# Patient Record
Sex: MALE | Race: ASIAN | NOT HISPANIC OR LATINO
[De-identification: names, ages, dates, MRNs, and addresses within clinical notes are randomized per-mention and may not be internally consistent; named-entity substitution may affect disease eponyms.]

---

## 2019-11-21 ENCOUNTER — TRANSCRIPTION ENCOUNTER (OUTPATIENT)
Age: 24
End: 2019-11-21

## 2019-11-22 ENCOUNTER — EMERGENCY (EMERGENCY)
Facility: HOSPITAL | Age: 24
LOS: 1 days | Discharge: HOME | End: 2019-11-22
Attending: EMERGENCY MEDICINE
Payer: COMMERCIAL

## 2019-11-22 VITALS
DIASTOLIC BLOOD PRESSURE: 86 MMHG | HEART RATE: 66 BPM | WEIGHT: 186.95 LBS | TEMPERATURE: 98 F | HEIGHT: 74 IN | RESPIRATION RATE: 16 BRPM | OXYGEN SATURATION: 100 % | SYSTOLIC BLOOD PRESSURE: 135 MMHG

## 2019-11-22 PROCEDURE — 99284 EMERGENCY DEPT VISIT MOD MDM: CPT

## 2019-11-22 RX ORDER — TETANUS TOXOID, REDUCED DIPHTHERIA TOXOID AND ACELLULAR PERTUSSIS VACCINE, ADSORBED 5; 2.5; 8; 8; 2.5 [IU]/.5ML; [IU]/.5ML; UG/.5ML; UG/.5ML; UG/.5ML
0.5 SUSPENSION INTRAMUSCULAR ONCE
Refills: 0 | Status: COMPLETED | OUTPATIENT
Start: 2019-11-22 | End: 2019-11-22

## 2019-11-22 RX ADMIN — TETANUS TOXOID, REDUCED DIPHTHERIA TOXOID AND ACELLULAR PERTUSSIS VACCINE, ADSORBED 0.5 MILLILITER(S): 5; 2.5; 8; 8; 2.5 SUSPENSION INTRAMUSCULAR at 22:45

## 2019-11-22 NOTE — ED ADULT NURSE NOTE - OBJECTIVE STATEMENT
pt is here for dog bite.  pt stated that being bitten on face by his own pet dog while grooming(cutting nails)at home, laceration to chin and upper lip, denied pain or fever, denied active bleeding at this time, no distress noted.

## 2019-11-22 NOTE — ED PROVIDER NOTE - CLINICAL SUMMARY MEDICAL DECISION MAKING FREE TEXT BOX
23 yo M with dog bite from family dog. Patient with superficial laceration to chin and raheem-oral region. Declined ER repair. Family requested repair by plastic surgeon. Plastics consulted, Dr. Hazel who came to the ED to repair laceration. Follow up with plastics. Augmentin and tetanus given.

## 2019-11-22 NOTE — ED ADULT TRIAGE NOTE - CCCP TRG CHIEF CMPLNT
dog bite [Consultation] : a consultation visit [FreeTextEntry1] : colon cancer screening, hx of colon polyps

## 2019-11-22 NOTE — ED PROVIDER NOTE - NSFOLLOWUPCLINICS_GEN_ALL_ED_FT
[facility]  Plastic Surgery  [address]  [place] [address]  Phone: [phone]  Fax:   Follow Up Time:

## 2019-11-22 NOTE — ED PROVIDER NOTE - NSFOLLOWUPINSTRUCTIONS_ED_ALL_ED_FT
You were seen today for your dog bite. You had your face sutured by the plastic surgeon, Dr. Hazel. You received a tetanus shot and antibiotics. Please follow up with Dr. Hazel in one week if needed. Please return to the Emergency Department for worsening signs or symptoms.    Animal Bite    WHAT YOU NEED TO KNOW:    Animal bite injuries range from shallow cuts to deep, life-threatening wounds. An animal can cut or puncture the skin when it bites. Your skin may be torn from your body. Your skin may swell or bruise even if the bite does not break the skin. Animal bites occur more often on the hands, arms, legs, and face. Bites from dogs and cats are the most common injuries.    DISCHARGE INSTRUCTIONS:    Return to the emergency department if:     You have a fever.      Your wound is red, swollen, and draining pus.      You see red streaks on the skin around the wound.      You can no longer move the bitten area.      Your heartbeat and breathing are much faster than usual.      You feel dizzy and confused.    Contact your healthcare provider if:     Your pain does not get better, even after you take pain medicine.      You have nightmares or flashbacks about the animal bite.       You have questions or concerns about your condition or care.    Medicines: You may need any of the following:     Antibiotics prevent or treat a bacterial infection.      Prescription pain medicine may be given. Ask how to take this medicine safely.      A tetanus vaccine may be needed to prevent tetanus. Tetanus is a life-threatening bacterial infection that affects the nerves and muscles. The bacteria can be spread through animal bites.       A rabies vaccine may be needed to prevent rabies. Rabies is a life-threatening viral infection. The virus can be spread through animal bites.      Take your medicine as directed. Contact your healthcare provider if you think your medicine is not helping or if you have side effects. Tell him of her if you are allergic to any medicine. Keep a list of the medicines, vitamins, and herbs you take. Include the amounts, and when and why you take them. Bring the list or the pill bottles to follow-up visits. Carry your medicine list with you in case of an emergency.    Follow up with your healthcare provider in 1 to 2 days: You may need to return to have your stitches removed. Write down your questions so you remember to ask them during your visits.    Self-care:     Apply antibiotic ointment as directed. This helps prevent infection in minor skin wounds. It is available without a doctor's order.      Keep the wound clean and covered. Wash the wound every day with soap and water or germ-killing cleanser. Ask your healthcare provider about the kinds of bandages to use.       Apply ice on your wound. Ice helps decrease swelling and pain. Ice may also help prevent tissue damage. Use an ice pack, or put crushed ice in a plastic bag. Cover it with a towel and place it on your wound for 15 to 20 minutes every hour or as directed.      Elevate the wound area. Raise your wound above the level of your heart as often as you can. This will help decrease swelling and pain. Prop your wound on pillows or blankets to keep it elevated comfortably.     Prevent another animal bite:     Learn to recognize the signs of a scared or angry pet. Avoid quick, sudden movements.      Do not step between animals that are fighting.      Do not leave a pet alone with a young child.      Do not disturb an animal while it eats, sleeps, or cares for its young.      Do not approach an animal you do not know, especially one that is tied up or caged.      Stay away from animals that seem sick or act strangely.      Do not feed or capture wild animals.

## 2019-11-22 NOTE — ED PROVIDER NOTE - PHYSICAL EXAMINATION
left chin curvilinear laceration left chin / 1 cm    lower lip intraoral ecchymosis   vertical superficial laceration 1 cm

## 2019-11-23 PROCEDURE — 90715 TDAP VACCINE 7 YRS/> IM: CPT

## 2019-11-23 PROCEDURE — 90471 IMMUNIZATION ADMIN: CPT

## 2019-11-23 PROCEDURE — 12011 RPR F/E/E/N/L/M 2.5 CM/<: CPT

## 2019-11-23 PROCEDURE — 99284 EMERGENCY DEPT VISIT MOD MDM: CPT | Mod: 25

## 2019-11-23 RX ADMIN — Medication 1 TABLET(S): at 01:12

## 2020-10-13 ENCOUNTER — APPOINTMENT (OUTPATIENT)
Dept: SURGERY | Facility: CLINIC | Age: 25
End: 2020-10-13
Payer: COMMERCIAL

## 2020-10-13 DIAGNOSIS — Z78.9 OTHER SPECIFIED HEALTH STATUS: ICD-10-CM

## 2020-10-13 DIAGNOSIS — K13.70 UNSPECIFIED LESIONS OF ORAL MUCOSA: ICD-10-CM

## 2020-10-13 DIAGNOSIS — Z87.09 PERSONAL HISTORY OF OTHER DISEASES OF THE RESPIRATORY SYSTEM: ICD-10-CM

## 2020-10-13 PROCEDURE — 40808 BIOPSY OF MOUTH LESION: CPT

## 2020-10-13 PROCEDURE — 99203 OFFICE O/P NEW LOW 30 MIN: CPT | Mod: 25

## 2020-10-14 PROBLEM — Z87.09 HISTORY OF EXTRINSIC ASTHMA: Status: RESOLVED | Noted: 2020-10-14 | Resolved: 2020-10-14

## 2020-10-14 NOTE — ASSESSMENT
[FreeTextEntry1] : probable pyogenic granuloma.  excisional biopsy performed under topical Cetacaine and 1 % lidocaine with epi. tolerated well. cauterized with silver nitrate. to call next week for results.

## 2020-10-14 NOTE — CONSULT LETTER
[Dear  ___] : Dear  [unfilled], [Consult Letter:] : I had the pleasure of evaluating your patient, [unfilled]. [Please see my note below.] : Please see my note below. [Consult Closing:] : Thank you very much for allowing me to participate in the care of this patient.  If you have any questions, please do not hesitate to contact me. [Sincerely,] : Sincerely, [FreeTextEntry2] : Dr. Silvestre Reich [FreeTextEntry3] : Hussain Baig MD, FACS\par System Director, Endocrine Surgery\par [facility]\par Assistant Clinical Professor of Surgery\par [employer] [employer] [employer] [facility]

## 2020-10-14 NOTE — HISTORY OF PRESENT ILLNESS
[de-identified] : Pt c/o lesion left upper gingiva for 1 month.   was larger but has decreased in size.  denies pain, infection or bleeding.  possible history of trauma from orthodontic retainer

## 2020-10-14 NOTE — PHYSICAL EXAM
[de-identified] : no palpable thyroid nodules [Midline] : located in midline position [de-identified] : 4 mm left upper gingiva medial to molar raised red lesion [Normal] : orientation to person, place, and time: normal

## 2023-10-24 NOTE — ED ADULT NURSE NOTE - NSIMPLEMENTINTERV_GEN_ALL_ED
Group Appointment Information    Date: 10/23/23   Attendance Duration: 45 minutes  Number of Participants: 10 participants  Program / Group: IOP - Intensive Outpatient Program  Topics Covered: Regulating emotions      Group Therapy Start Time: 11:00 AM    Attendance: Attended  Participation: Active verbal participation    Affect/Mood Range: Normal range  Affect/Mood Display: CWC - Congruent w/Content  Cognition: Alert and Oriented    Evidence of imminent suicide risk: No   Evidence of imminent homicide risk: No     Therapeutic Interventions: Emotion clarification and Supportive psychotherapy  Progress Toward Treatment Goal: Moderate improvement; Pt continued to process emotions.    Implemented All Universal Safety Interventions:  [place] to call system. Call bell, personal items and telephone within reach. Instruct patient to call for assistance. Room bathroom lighting operational. Non-slip footwear when patient is off stretcher. Physically safe environment: no spills, clutter or unnecessary equipment. Stretcher in lowest position, wheels locked, appropriate side rails in place.